# Patient Record
Sex: MALE | Race: WHITE | NOT HISPANIC OR LATINO | URBAN - METROPOLITAN AREA
[De-identification: names, ages, dates, MRNs, and addresses within clinical notes are randomized per-mention and may not be internally consistent; named-entity substitution may affect disease eponyms.]

---

## 2022-11-16 ENCOUNTER — OFFICE (OUTPATIENT)
Dept: URBAN - METROPOLITAN AREA CLINIC 10 | Facility: CLINIC | Age: 82
End: 2022-11-16
Payer: COMMERCIAL

## 2022-11-16 VITALS
HEART RATE: 72 BPM | HEIGHT: 70 IN | DIASTOLIC BLOOD PRESSURE: 70 MMHG | SYSTOLIC BLOOD PRESSURE: 134 MMHG | OXYGEN SATURATION: 97 % | WEIGHT: 135 LBS

## 2022-11-16 DIAGNOSIS — R14.2 ERUCTATION: ICD-10-CM

## 2022-11-16 DIAGNOSIS — R10.13 EPIGASTRIC PAIN: ICD-10-CM

## 2022-11-16 LAB
AMYLASE: 107 U/L (ref 31–110)
CBC, PLATELET, NO DIFFERENTIAL: HEMATOCRIT: 36.8 % — LOW (ref 37.5–51)
CBC, PLATELET, NO DIFFERENTIAL: HEMOGLOBIN: 12.4 G/DL — LOW (ref 13–17.7)
CBC, PLATELET, NO DIFFERENTIAL: MCH: 31.7 PG (ref 26.6–33)
CBC, PLATELET, NO DIFFERENTIAL: MCHC: 33.7 G/DL (ref 31.5–35.7)
CBC, PLATELET, NO DIFFERENTIAL: MCV: 94 FL (ref 79–97)
CBC, PLATELET, NO DIFFERENTIAL: PLATELETS: 441 X10E3/UL (ref 150–450)
CBC, PLATELET, NO DIFFERENTIAL: RBC: 3.91 X10E6/UL — LOW (ref 4.14–5.8)
CBC, PLATELET, NO DIFFERENTIAL: RDW: 11.4 % — LOW (ref 11.6–15.4)
CBC, PLATELET, NO DIFFERENTIAL: WBC: 11.9 X10E3/UL — HIGH (ref 3.4–10.8)
COMP. METABOLIC PANEL (14): A/G RATIO: 1 — LOW (ref 1.2–2.2)
COMP. METABOLIC PANEL (14): ALBUMIN: 3.7 G/DL (ref 3.6–4.6)
COMP. METABOLIC PANEL (14): ALKALINE PHOSPHATASE: 70 IU/L (ref 44–121)
COMP. METABOLIC PANEL (14): ALT (SGPT): 46 IU/L — HIGH (ref 0–44)
COMP. METABOLIC PANEL (14): AST (SGOT): 31 IU/L (ref 0–40)
COMP. METABOLIC PANEL (14): BILIRUBIN, TOTAL: <0.2 MG/DL
COMP. METABOLIC PANEL (14): BUN/CREATININE RATIO: 22 (ref 10–24)
COMP. METABOLIC PANEL (14): BUN: 21 MG/DL (ref 8–27)
COMP. METABOLIC PANEL (14): CALCIUM: 9 MG/DL (ref 8.6–10.2)
COMP. METABOLIC PANEL (14): CARBON DIOXIDE, TOTAL: 27 MMOL/L (ref 20–29)
COMP. METABOLIC PANEL (14): CHLORIDE: 98 MMOL/L (ref 96–106)
COMP. METABOLIC PANEL (14): CREATININE: 0.96 MG/DL (ref 0.76–1.27)
COMP. METABOLIC PANEL (14): EGFR: 79 ML/MIN/1.73 (ref 59–?)
COMP. METABOLIC PANEL (14): GLOBULIN, TOTAL: 3.6 G/DL (ref 1.5–4.5)
COMP. METABOLIC PANEL (14): GLUCOSE: 114 MG/DL — HIGH (ref 70–99)
COMP. METABOLIC PANEL (14): POTASSIUM: 4.2 MMOL/L (ref 3.5–5.2)
COMP. METABOLIC PANEL (14): PROTEIN, TOTAL: 7.3 G/DL (ref 6–8.5)
COMP. METABOLIC PANEL (14): SODIUM: 136 MMOL/L (ref 134–144)
LIPASE: 44 U/L (ref 13–78)

## 2022-11-16 PROCEDURE — 99204 OFFICE O/P NEW MOD 45 MIN: CPT | Performed by: INTERNAL MEDICINE

## 2022-11-16 RX ORDER — PANTOPRAZOLE SODIUM 40 MG/1
TABLET, DELAYED RELEASE ORAL
Qty: 90 | Refills: 3 | Status: COMPLETED
Start: 2022-11-16 | End: 2024-04-15

## 2022-11-17 LAB
AMYLASE: 107 U/L (ref 31–110)
CBC, PLATELET, NO DIFFERENTIAL: HEMATOCRIT: 36.8 % — LOW (ref 37.5–51)
CBC, PLATELET, NO DIFFERENTIAL: HEMOGLOBIN: 12.4 G/DL — LOW (ref 13–17.7)
CBC, PLATELET, NO DIFFERENTIAL: MCH: 31.7 PG (ref 26.6–33)
CBC, PLATELET, NO DIFFERENTIAL: MCHC: 33.7 G/DL (ref 31.5–35.7)
CBC, PLATELET, NO DIFFERENTIAL: MCV: 94 FL (ref 79–97)
CBC, PLATELET, NO DIFFERENTIAL: NRBC: (no result)
CBC, PLATELET, NO DIFFERENTIAL: PLATELETS: 441 X10E3/UL (ref 150–450)
CBC, PLATELET, NO DIFFERENTIAL: RBC: 3.91 X10E6/UL — LOW (ref 4.14–5.8)
CBC, PLATELET, NO DIFFERENTIAL: RDW: 11.4 % — LOW (ref 11.6–15.4)
CBC, PLATELET, NO DIFFERENTIAL: WBC: 11.9 X10E3/UL — HIGH (ref 3.4–10.8)
COMP. METABOLIC PANEL (14): A/G RATIO: 1 — LOW (ref 1.2–2.2)
COMP. METABOLIC PANEL (14): ALBUMIN: 3.7 G/DL (ref 3.6–4.6)
COMP. METABOLIC PANEL (14): ALKALINE PHOSPHATASE: 70 IU/L (ref 44–121)
COMP. METABOLIC PANEL (14): ALT (SGPT): 46 IU/L — HIGH (ref 0–44)
COMP. METABOLIC PANEL (14): AST (SGOT): 31 IU/L (ref 0–40)
COMP. METABOLIC PANEL (14): BILIRUBIN, TOTAL: <0.2 MG/DL
COMP. METABOLIC PANEL (14): BUN/CREATININE RATIO: 22 (ref 10–24)
COMP. METABOLIC PANEL (14): BUN: 21 MG/DL (ref 8–27)
COMP. METABOLIC PANEL (14): CALCIUM: 9 MG/DL (ref 8.6–10.2)
COMP. METABOLIC PANEL (14): CARBON DIOXIDE, TOTAL: 27 MMOL/L (ref 20–29)
COMP. METABOLIC PANEL (14): CHLORIDE: 98 MMOL/L (ref 96–106)
COMP. METABOLIC PANEL (14): CREATININE: 0.96 MG/DL (ref 0.76–1.27)
COMP. METABOLIC PANEL (14): EGFR: 79 ML/MIN/1.73 (ref 59–?)
COMP. METABOLIC PANEL (14): GLOBULIN, TOTAL: 3.6 G/DL (ref 1.5–4.5)
COMP. METABOLIC PANEL (14): GLUCOSE: 114 MG/DL — HIGH (ref 70–99)
COMP. METABOLIC PANEL (14): POTASSIUM: 4.2 MMOL/L (ref 3.5–5.2)
COMP. METABOLIC PANEL (14): PROTEIN, TOTAL: 7.3 G/DL (ref 6–8.5)
COMP. METABOLIC PANEL (14): SODIUM: 136 MMOL/L (ref 134–144)
LIPASE: 44 U/L (ref 13–78)

## 2022-11-30 ENCOUNTER — AMBULATORY SURGICAL CENTER (OUTPATIENT)
Dept: URBAN - METROPOLITAN AREA SURGERY 3 | Facility: SURGERY | Age: 82
End: 2022-11-30

## 2022-11-30 ENCOUNTER — OFFICE (OUTPATIENT)
Dept: URBAN - METROPOLITAN AREA PATHOLOGY 22 | Facility: PATHOLOGY | Age: 82
End: 2022-11-30

## 2022-11-30 DIAGNOSIS — K29.50 UNSPECIFIED CHRONIC GASTRITIS WITHOUT BLEEDING: ICD-10-CM

## 2022-11-30 DIAGNOSIS — K21.00 GASTRO-ESOPHAGEAL REFLUX DISEASE WITH ESOPHAGITIS, WITHOUT B: ICD-10-CM

## 2022-11-30 DIAGNOSIS — R10.13 EPIGASTRIC PAIN: ICD-10-CM

## 2022-11-30 PROBLEM — K31.89 OTHER DISEASES OF STOMACH AND DUODENUM: Status: ACTIVE | Noted: 2022-11-30

## 2022-11-30 PROBLEM — K22.70 BARRETT'S ESOPHAGUS WITHOUT DYSPLASIA: Status: ACTIVE | Noted: 2022-11-30

## 2022-11-30 PROCEDURE — 43239 EGD BIOPSY SINGLE/MULTIPLE: CPT | Performed by: INTERNAL MEDICINE

## 2022-11-30 PROCEDURE — G8918 PT W/O PREOP ORDER IV AB PRO: HCPCS | Performed by: INTERNAL MEDICINE

## 2023-03-27 ENCOUNTER — OFFICE (OUTPATIENT)
Dept: URBAN - METROPOLITAN AREA CLINIC 104 | Facility: CLINIC | Age: 83
End: 2023-03-27

## 2023-03-27 VITALS
OXYGEN SATURATION: 96 % | HEIGHT: 70 IN | DIASTOLIC BLOOD PRESSURE: 68 MMHG | WEIGHT: 137 LBS | HEART RATE: 62 BPM | SYSTOLIC BLOOD PRESSURE: 118 MMHG

## 2023-03-27 DIAGNOSIS — N40.0 BENIGN PROSTATIC HYPERPLASIA WITHOUT LOWER URINARY TRACT SYM: ICD-10-CM

## 2023-03-27 DIAGNOSIS — R10.13 EPIGASTRIC PAIN: ICD-10-CM

## 2023-03-27 DIAGNOSIS — N13.30 UNSPECIFIED HYDRONEPHROSIS: ICD-10-CM

## 2023-03-27 PROCEDURE — 99213 OFFICE O/P EST LOW 20 MIN: CPT | Performed by: INTERNAL MEDICINE

## 2024-04-15 ENCOUNTER — OFFICE (OUTPATIENT)
Dept: URBAN - METROPOLITAN AREA CLINIC 10 | Facility: CLINIC | Age: 84
End: 2024-04-15
Payer: COMMERCIAL

## 2024-04-15 VITALS
HEART RATE: 74 BPM | WEIGHT: 126 LBS | SYSTOLIC BLOOD PRESSURE: 134 MMHG | DIASTOLIC BLOOD PRESSURE: 60 MMHG | OXYGEN SATURATION: 98 % | HEIGHT: 70 IN

## 2024-04-15 DIAGNOSIS — R19.5 OTHER FECAL ABNORMALITIES: ICD-10-CM

## 2024-04-15 DIAGNOSIS — K21.9 GASTRO-ESOPHAGEAL REFLUX DISEASE WITHOUT ESOPHAGITIS: ICD-10-CM

## 2024-04-15 PROCEDURE — 99214 OFFICE O/P EST MOD 30 MIN: CPT | Performed by: NURSE PRACTITIONER

## 2024-04-15 RX ORDER — POLYETHYLENE GLYCOL 3350, SODIUM SULFATE, SODIUM CHLORIDE, POTASSIUM CHLORIDE, ASCORBIC ACID, SODIUM ASCORBATE 140-9-5.2G
KIT ORAL
Qty: 1 | Refills: 0 | Status: COMPLETED
Start: 2024-04-15 | End: 2024-04-22

## 2024-06-24 ENCOUNTER — AMBULATORY SURGICAL CENTER (OUTPATIENT)
Dept: URBAN - METROPOLITAN AREA SURGERY 1 | Facility: SURGERY | Age: 84
End: 2024-06-24

## 2024-06-24 ENCOUNTER — AMBULATORY SURGICAL CENTER (OUTPATIENT)
Dept: URBAN - METROPOLITAN AREA SURGERY 1 | Facility: SURGERY | Age: 84
End: 2024-06-24
Payer: COMMERCIAL

## 2024-06-24 ENCOUNTER — OFFICE (OUTPATIENT)
Dept: URBAN - METROPOLITAN AREA PATHOLOGY 12 | Facility: PATHOLOGY | Age: 84
End: 2024-06-24

## 2024-06-24 VITALS
OXYGEN SATURATION: 96 % | DIASTOLIC BLOOD PRESSURE: 55 MMHG | RESPIRATION RATE: 16 BRPM | DIASTOLIC BLOOD PRESSURE: 55 MMHG | SYSTOLIC BLOOD PRESSURE: 121 MMHG | DIASTOLIC BLOOD PRESSURE: 61 MMHG | WEIGHT: 125 LBS | TEMPERATURE: 97.5 F | TEMPERATURE: 97.5 F | HEART RATE: 70 BPM | OXYGEN SATURATION: 99 % | RESPIRATION RATE: 18 BRPM | OXYGEN SATURATION: 96 % | OXYGEN SATURATION: 97 % | HEART RATE: 69 BPM | HEART RATE: 74 BPM | HEART RATE: 70 BPM | SYSTOLIC BLOOD PRESSURE: 134 MMHG | HEIGHT: 70 IN | SYSTOLIC BLOOD PRESSURE: 116 MMHG | SYSTOLIC BLOOD PRESSURE: 134 MMHG | HEART RATE: 76 BPM | HEART RATE: 74 BPM | DIASTOLIC BLOOD PRESSURE: 70 MMHG | OXYGEN SATURATION: 97 % | SYSTOLIC BLOOD PRESSURE: 134 MMHG | RESPIRATION RATE: 16 BRPM | SYSTOLIC BLOOD PRESSURE: 116 MMHG | DIASTOLIC BLOOD PRESSURE: 55 MMHG | TEMPERATURE: 98.3 F | HEIGHT: 70 IN | DIASTOLIC BLOOD PRESSURE: 57 MMHG | OXYGEN SATURATION: 99 % | OXYGEN SATURATION: 97 % | DIASTOLIC BLOOD PRESSURE: 57 MMHG | SYSTOLIC BLOOD PRESSURE: 127 MMHG | OXYGEN SATURATION: 98 % | HEART RATE: 76 BPM | TEMPERATURE: 98.3 F | WEIGHT: 125 LBS | SYSTOLIC BLOOD PRESSURE: 121 MMHG | DIASTOLIC BLOOD PRESSURE: 70 MMHG | DIASTOLIC BLOOD PRESSURE: 57 MMHG | HEIGHT: 70 IN | DIASTOLIC BLOOD PRESSURE: 61 MMHG | OXYGEN SATURATION: 98 % | WEIGHT: 125 LBS | HEART RATE: 69 BPM | DIASTOLIC BLOOD PRESSURE: 49 MMHG | HEART RATE: 71 BPM | TEMPERATURE: 97.5 F | OXYGEN SATURATION: 99 % | OXYGEN SATURATION: 96 % | HEART RATE: 71 BPM | SYSTOLIC BLOOD PRESSURE: 121 MMHG | DIASTOLIC BLOOD PRESSURE: 61 MMHG | SYSTOLIC BLOOD PRESSURE: 127 MMHG | SYSTOLIC BLOOD PRESSURE: 127 MMHG | HEART RATE: 74 BPM | HEART RATE: 70 BPM | SYSTOLIC BLOOD PRESSURE: 116 MMHG | DIASTOLIC BLOOD PRESSURE: 49 MMHG | RESPIRATION RATE: 18 BRPM | HEART RATE: 69 BPM | DIASTOLIC BLOOD PRESSURE: 70 MMHG | DIASTOLIC BLOOD PRESSURE: 49 MMHG | HEART RATE: 71 BPM | RESPIRATION RATE: 18 BRPM | HEART RATE: 76 BPM | TEMPERATURE: 98.3 F | OXYGEN SATURATION: 98 % | RESPIRATION RATE: 16 BRPM

## 2024-06-24 DIAGNOSIS — K63.5 POLYP OF COLON: ICD-10-CM

## 2024-06-24 DIAGNOSIS — D12.0 BENIGN NEOPLASM OF CECUM: ICD-10-CM

## 2024-06-24 DIAGNOSIS — K92.2 GASTROINTESTINAL HEMORRHAGE, UNSPECIFIED: ICD-10-CM

## 2024-06-24 DIAGNOSIS — D12.2 BENIGN NEOPLASM OF ASCENDING COLON: ICD-10-CM

## 2024-06-24 PROCEDURE — 45385 COLONOSCOPY W/LESION REMOVAL: CPT | Performed by: INTERNAL MEDICINE

## 2024-06-24 PROCEDURE — 88305 TISSUE EXAM BY PATHOLOGIST: CPT | Performed by: STUDENT IN AN ORGANIZED HEALTH CARE EDUCATION/TRAINING PROGRAM

## 2024-06-24 RX ADMIN — PROPOFOL 310 MG: 10 INJECTION, EMULSION INTRAVENOUS at 11:32

## 2024-06-27 LAB
GASTRO ONE PATHOLOGY: PDF REPORT: (no result)

## 2025-06-23 ENCOUNTER — OFFICE (OUTPATIENT)
Dept: URBAN - METROPOLITAN AREA CLINIC 10 | Facility: CLINIC | Age: 85
End: 2025-06-23
Payer: MEDICARE

## 2025-06-23 VITALS
DIASTOLIC BLOOD PRESSURE: 67 MMHG | HEART RATE: 78 BPM | HEIGHT: 70 IN | WEIGHT: 115 LBS | OXYGEN SATURATION: 99 % | SYSTOLIC BLOOD PRESSURE: 127 MMHG

## 2025-06-23 DIAGNOSIS — R13.12 DYSPHAGIA, OROPHARYNGEAL PHASE: ICD-10-CM

## 2025-06-23 DIAGNOSIS — D12.6 BENIGN NEOPLASM OF COLON, UNSPECIFIED: ICD-10-CM

## 2025-06-23 DIAGNOSIS — Z86.0100 PERSONAL HISTORY OF COLON POLYPS, UNSPECIFIED: ICD-10-CM

## 2025-06-23 DIAGNOSIS — K21.9 GASTRO-ESOPHAGEAL REFLUX DISEASE WITHOUT ESOPHAGITIS: ICD-10-CM

## 2025-06-23 PROCEDURE — 99214 OFFICE O/P EST MOD 30 MIN: CPT | Performed by: NURSE PRACTITIONER

## 2025-06-23 NOTE — SERVICENOTES
After consent was obtained portions of the chart note were captured with NHUNG sorensen.

  Discussed about colon screening and explained risks and benefits.  He is adamant that he does not want to address or discuss colonoscopy at this time.  Discussed with him in length about pathology from colonoscopy in 2024, his increased risk for colon cancer.  He and wife verbalized understanding of this and do NOT wish to proceed with colonoscopy at this time.  Discussed about esophageal manometry versus modified barium swallow with SLP interpretation.  He is declining both of the options.  Discussed about ENT referral and he declines this as well.  States he will simply choose food better, slower and wants to follow up as needed.  Discussed with patient and his wife that I would prefer a 6 week follow up and to please notify me should they have a change in mind regarding any of the above.  They are agreeable.

## 2025-06-23 NOTE — SERVICEHPINOTES
Mr. Perez is 84 years old. He is here today for follow up dysphagia s/p EGD with empiric dilation. He experiences difficulty swallowing solid foods, particularly hard meats and semi-hard items like noodles, while soft foods and liquids are easier to swallow. He describes dysphagia as chewing solids and then being unable to transfer to posterior pharynx for swallowing. States once it gets past this point there is no problem. There is no worsening of symptoms with dairy products and no issues with swallowing liquids.He has not undergone esophageal manometry or a modified barium swallow test. He maintains nutrition with protein drinks and eggs. He denies EtOH use.He has a history of sessile serrated polyposis noted on last colonoscopy 2024. He is currently taking pantoprazole for heartburn and adheres to his medication regimen without missing doses. There is a steady loss of weight noted in the records over the past few years.
[facility]br 06/2025 EGD by Dr. Chapin for dysphagia with few concentric rings but no dominant stricture no mass or varices empiric dilation in the EGJ to 18 mm pale whitish mucosa immediate pre-pyloric area greater curvature and just superior to pylorus with biopsies showing reparative surface changes normal Duodenum. Normal esophageal biopsies. 06/2024 colonoscopy for evaluation of unexplained GI bleeding with > 20 sessile serrated polyps all > 5 mm polyps &lt 5 mm left in place due to polyp burden. Pathology demonstrated 10 SSA, 2 Xanthomonas polyps, 2 hyperplastic polyps.  3/2024- HCT 27/MCV 86/  CR 2.2 AST 31/ALT 22/TB 0.4/ALP 72br-heme positive stool (1 of 3) Fe sat 27% B12 228 folate nl11/16/2022- AST 31/ALT 46/ TB 0.2/alp 70/albumin 3.7 creatinine 0.96 WBC 11.9/HGB 12.4/  /MCV 94 amylase and lipase normal

## 2025-08-04 ENCOUNTER — OFFICE (OUTPATIENT)
Dept: URBAN - METROPOLITAN AREA CLINIC 10 | Facility: CLINIC | Age: 85
End: 2025-08-04
Payer: MEDICARE

## 2025-08-04 VITALS
WEIGHT: 123 LBS | SYSTOLIC BLOOD PRESSURE: 126 MMHG | OXYGEN SATURATION: 98 % | DIASTOLIC BLOOD PRESSURE: 69 MMHG | HEIGHT: 70 IN | HEART RATE: 73 BPM

## 2025-08-04 DIAGNOSIS — R13.12 DYSPHAGIA, OROPHARYNGEAL PHASE: ICD-10-CM

## 2025-08-04 DIAGNOSIS — Z86.0100 PERSONAL HISTORY OF COLON POLYPS, UNSPECIFIED: ICD-10-CM

## 2025-08-04 DIAGNOSIS — K21.9 GASTRO-ESOPHAGEAL REFLUX DISEASE WITHOUT ESOPHAGITIS: ICD-10-CM

## 2025-08-04 DIAGNOSIS — D12.6 BENIGN NEOPLASM OF COLON, UNSPECIFIED: ICD-10-CM

## 2025-08-04 PROCEDURE — 99214 OFFICE O/P EST MOD 30 MIN: CPT | Performed by: NURSE PRACTITIONER

## 2025-08-04 NOTE — SERVICEHPINOTES
Mr. Perez is 84 years old. He is here today for follow up dysphagia. Swallowing difficulties have been ongoing, with some improvement noted. Larger bites are easier to swallow, while smaller pieces require more effort. Multiple swallows of water are needed to take morning pills, but he can eat biscuits and gravy without difficulty.Weight has fluctuated, with initial loss followed by gain. Currently, weight is increasing daily. Megestrol was started last week to aid in weight gain, though its effectiveness is uncertain. He attributes weight gain to increased food intake.He has a history of sessile serrated polyposis noted on last colonoscopy 2024. He is currently taking pantoprazole for heartburn and adheres to his medication regimen without missing doses. There is a steady loss of weight noted in the records over the past few years.  However, he is up about 8 lb from last office visit.[facility] and [facility] report:br06/2025 EGD by Dr. Chapin for dysphagia with few concentric rings but no dominant stricture no mass or varices empiric dilation in the EGJ to 18 mm pale whitish mucosa immediate pre-pyloric area greater curvature and just superior to pylorus with biopsies showing reparative surface changes normal Duodenum. Normal esophageal biopsies.06/2024 colonoscopy for evaluation of unexplained GI bleeding with > 20 sessile serrated polyps all > 5 mm polyps &lt 5 mm left in place due to polyp burden. Pathology demonstrated 10 SSA, 2 Xanthomonas polyps, 2 hyperplastic polyps.3/2024- HCT 27/MCV 86/  CR 2.2 AST 31/ALT 22/TB 0.4/ALP 72br-heme positive stool (1 of 3) Fe sat 27% B12 228 folate nl11/16/2022- AST 31/ALT 46/ TB 0.2/alp 70/albumin 3.7 creatinine 0.96 WBC 11.9/HGB 12.4/  /MCV 94 amylase and lipase normal

## 2025-08-04 NOTE — SERVICENOTES
After consent was obtained portions of the chart note were captured with NHUNG sorensen.  He remains adamant that he does not wish to undergo any endoscopic  testing, etc. as he feels great.  I will plan a six-month follow up.